# Patient Record
Sex: MALE | Race: WHITE | NOT HISPANIC OR LATINO | URBAN - METROPOLITAN AREA
[De-identification: names, ages, dates, MRNs, and addresses within clinical notes are randomized per-mention and may not be internally consistent; named-entity substitution may affect disease eponyms.]

---

## 2023-03-06 ENCOUNTER — APPOINTMENT (OUTPATIENT)
Dept: GENERAL RADIOLOGY | Facility: HOSPITAL | Age: 38
End: 2023-03-06
Payer: COMMERCIAL

## 2023-03-06 ENCOUNTER — HOSPITAL ENCOUNTER (EMERGENCY)
Facility: HOSPITAL | Age: 38
Discharge: HOME | End: 2023-03-06
Attending: EMERGENCY MEDICINE | Admitting: EMERGENCY MEDICINE
Payer: COMMERCIAL

## 2023-03-06 VITALS
RESPIRATION RATE: 20 BRPM | HEART RATE: 57 BPM | TEMPERATURE: 98 F | BODY MASS INDEX: 32.9 KG/M2 | DIASTOLIC BLOOD PRESSURE: 100 MMHG | WEIGHT: 248.24 LBS | SYSTOLIC BLOOD PRESSURE: 131 MMHG | OXYGEN SATURATION: 96 % | HEIGHT: 73 IN

## 2023-03-06 DIAGNOSIS — K21.9 GASTROESOPHAGEAL REFLUX DISEASE WITHOUT ESOPHAGITIS: Primary | ICD-10-CM

## 2023-03-06 LAB
ALBUMIN SERPL-MCNC: 4.5 G/DL (ref 3.5–5.2)
ALBUMIN/GLOB SERPL: 1.7 G/DL
ALP SERPL-CCNC: 79 U/L (ref 39–117)
ALT SERPL W P-5'-P-CCNC: 17 U/L (ref 1–41)
ANION GAP SERPL CALCULATED.3IONS-SCNC: 10.8 MMOL/L (ref 5–15)
AST SERPL-CCNC: 15 U/L (ref 1–40)
BASOPHILS # BLD AUTO: 0.03 10*3/MM3 (ref 0–0.2)
BASOPHILS NFR BLD AUTO: 0.4 % (ref 0–1.5)
BILIRUB SERPL-MCNC: 0.4 MG/DL (ref 0–1.2)
BUN SERPL-MCNC: 13 MG/DL (ref 6–20)
BUN/CREAT SERPL: 12.7 (ref 7–25)
CALCIUM SPEC-SCNC: 9.1 MG/DL (ref 8.6–10.5)
CHLORIDE SERPL-SCNC: 106 MMOL/L (ref 98–107)
CO2 SERPL-SCNC: 23.2 MMOL/L (ref 22–29)
CREAT SERPL-MCNC: 1.02 MG/DL (ref 0.76–1.27)
DEPRECATED RDW RBC AUTO: 38.8 FL (ref 37–54)
EGFRCR SERPLBLD CKD-EPI 2021: 96.5 ML/MIN/1.73
EOSINOPHIL # BLD AUTO: 0.18 10*3/MM3 (ref 0–0.4)
EOSINOPHIL NFR BLD AUTO: 2.7 % (ref 0.3–6.2)
ERYTHROCYTE [DISTWIDTH] IN BLOOD BY AUTOMATED COUNT: 12.1 % (ref 12.3–15.4)
GLOBULIN UR ELPH-MCNC: 2.7 GM/DL
GLUCOSE SERPL-MCNC: 77 MG/DL (ref 65–99)
HCT VFR BLD AUTO: 44.8 % (ref 37.5–51)
HGB BLD-MCNC: 16 G/DL (ref 13–17.7)
HOLD SPECIMEN: NORMAL
HOLD SPECIMEN: NORMAL
IMM GRANULOCYTES # BLD AUTO: 0.01 10*3/MM3 (ref 0–0.05)
IMM GRANULOCYTES NFR BLD AUTO: 0.1 % (ref 0–0.5)
LYMPHOCYTES # BLD AUTO: 2.33 10*3/MM3 (ref 0.7–3.1)
LYMPHOCYTES NFR BLD AUTO: 34.9 % (ref 19.6–45.3)
MCH RBC QN AUTO: 31.6 PG (ref 26.6–33)
MCHC RBC AUTO-ENTMCNC: 35.7 G/DL (ref 31.5–35.7)
MCV RBC AUTO: 88.5 FL (ref 79–97)
MONOCYTES # BLD AUTO: 0.46 10*3/MM3 (ref 0.1–0.9)
MONOCYTES NFR BLD AUTO: 6.9 % (ref 5–12)
NEUTROPHILS NFR BLD AUTO: 3.66 10*3/MM3 (ref 1.7–7)
NEUTROPHILS NFR BLD AUTO: 55 % (ref 42.7–76)
NRBC BLD AUTO-RTO: 0 /100 WBC (ref 0–0.2)
NT-PROBNP SERPL-MCNC: <36 PG/ML (ref 0–450)
PLATELET # BLD AUTO: 205 10*3/MM3 (ref 140–450)
PMV BLD AUTO: 9.3 FL (ref 6–12)
POTASSIUM SERPL-SCNC: 3.7 MMOL/L (ref 3.5–5.2)
PROT SERPL-MCNC: 7.2 G/DL (ref 6–8.5)
RBC # BLD AUTO: 5.06 10*6/MM3 (ref 4.14–5.8)
SODIUM SERPL-SCNC: 140 MMOL/L (ref 136–145)
TROPONIN T SERPL HS-MCNC: <6 NG/L
WBC NRBC COR # BLD: 6.67 10*3/MM3 (ref 3.4–10.8)
WHOLE BLOOD HOLD COAG: NORMAL
WHOLE BLOOD HOLD SPECIMEN: NORMAL

## 2023-03-06 PROCEDURE — 93005 ELECTROCARDIOGRAM TRACING: CPT | Performed by: EMERGENCY MEDICINE

## 2023-03-06 PROCEDURE — 85025 COMPLETE CBC W/AUTO DIFF WBC: CPT | Performed by: EMERGENCY MEDICINE

## 2023-03-06 PROCEDURE — 99284 EMERGENCY DEPT VISIT MOD MDM: CPT

## 2023-03-06 PROCEDURE — 93005 ELECTROCARDIOGRAM TRACING: CPT

## 2023-03-06 PROCEDURE — 93010 ELECTROCARDIOGRAM REPORT: CPT | Performed by: INTERNAL MEDICINE

## 2023-03-06 PROCEDURE — 83880 ASSAY OF NATRIURETIC PEPTIDE: CPT | Performed by: EMERGENCY MEDICINE

## 2023-03-06 PROCEDURE — 84484 ASSAY OF TROPONIN QUANT: CPT | Performed by: EMERGENCY MEDICINE

## 2023-03-06 PROCEDURE — 80053 COMPREHEN METABOLIC PANEL: CPT | Performed by: EMERGENCY MEDICINE

## 2023-03-06 PROCEDURE — 71045 X-RAY EXAM CHEST 1 VIEW: CPT

## 2023-03-06 RX ORDER — TRAZODONE HYDROCHLORIDE 50 MG/1
100 TABLET ORAL
COMMUNITY
Start: 2015-06-29

## 2023-03-06 RX ORDER — SODIUM CHLORIDE 0.9 % (FLUSH) 0.9 %
10 SYRINGE (ML) INJECTION AS NEEDED
Status: DISCONTINUED | OUTPATIENT
Start: 2023-03-06 | End: 2023-03-06 | Stop reason: HOSPADM

## 2023-03-06 RX ORDER — PRAZOSIN HYDROCHLORIDE 2 MG/1
2 CAPSULE ORAL NIGHTLY
COMMUNITY

## 2023-03-06 RX ORDER — TOPIRAMATE 100 MG/1
100 TABLET, FILM COATED ORAL DAILY
COMMUNITY

## 2023-03-06 RX ORDER — MIRTAZAPINE 30 MG/1
30 TABLET, FILM COATED ORAL NIGHTLY
COMMUNITY

## 2023-03-06 RX ORDER — GALCANEZUMAB 120 MG/ML
INJECTION, SOLUTION SUBCUTANEOUS
COMMUNITY
Start: 2023-01-31

## 2023-03-06 RX ORDER — OMEPRAZOLE 40 MG/1
40 CAPSULE, DELAYED RELEASE ORAL DAILY
Qty: 10 CAPSULE | Refills: 0 | Status: SHIPPED | OUTPATIENT
Start: 2023-03-06

## 2023-03-06 NOTE — ED PROVIDER NOTES
"Time: 12:36 PM EST  Date of encounter:  3/6/2023  Independent Historian/Clinical History and Information was obtained by:   {Blank multiple:98274}  Chief Complaint: ***    History is limited by: {Limited History:76303}    History of Present Illness:  Patient is a 38 y.o. year old male who presents to the emergency department for evaluation of ***    HPI    Patient Care Team  Primary Care Provider: Provider, Stefanie Known    Past Medical History:     No Known Allergies  History reviewed. No pertinent past medical history.  History reviewed. No pertinent surgical history.  History reviewed. No pertinent family history.    Home Medications:  Prior to Admission medications    Medication Sig Start Date End Date Taking? Authorizing Provider   traZODone (DESYREL) 50 MG tablet Take 2 tablets by mouth. 6/29/15  Yes Yinka Vang MD   Emgality 120 MG/ML auto-injector pen INJECT 1 SYRINGE UNDER THE SKIN FOR 1 DOSE 1/31/23   Yinka Vang MD   mirtazapine (REMERON) 30 MG tablet Take 1 tablet by mouth Every Night.    Yinka Vang MD   prazosin (MINIPRESS) 2 MG capsule Take 1 capsule by mouth Every Night.    Yinka Vang MD   topiramate (TOPAMAX) 100 MG tablet Take 1 tablet by mouth Daily.    ProviderYinka MD        Social History:   Social History     Tobacco Use   • Smoking status: Never   • Smokeless tobacco: Never   Vaping Use   • Vaping Use: Never used   Substance Use Topics   • Alcohol use: Never   • Drug use: Never         Review of Systems:  Review of Systems     Physical Exam:  /100   Pulse 57   Temp 98 °F (36.7 °C) (Oral)   Resp 20   Ht 185.4 cm (73\")   Wt 113 kg (248 lb 3.8 oz)   SpO2 96%   BMI 32.75 kg/m²     Physical Exam             Procedures:  Procedures      Medical Decision Making:      Comorbidities that affect care:    {Comorbidities that affect care:35435}    External Notes reviewed:    Encounter review: Patient has no past ED visits in our system.  Most " recent office visit was 7/6/2022 for neurology related to migraines.  No past labs available for review.      The following orders were placed and all results were independently analyzed by me:  Orders Placed This Encounter   Procedures   • XR Chest 1 View   • [place] Draw   • Comprehensive Metabolic Panel   • BNP   • Single High Sensitivity Troponin T   • CBC Auto Differential   • NPO Diet NPO Type: Strict NPO   • Undress & Gown   • Cardiac Monitoring   • Continuous Pulse Oximetry   • Vital Signs   • Oxygen Therapy- Nasal Cannula; 2 LPM; Titrate for SPO2: equal to or greater than, 92%   • ECG 12 Lead ED Triage Standing Order; SOA   • Insert Peripheral IV   • CBC & Differential   • Green Top (Gel)   • Lavender Top   • Gold Top - SST   • Light Blue Top       Medications Given in the Emergency Department:  Medications   sodium chloride 0.9 % flush 10 mL (has no administration in time range)        ED Course:    ED Course as of 03/06/23 1328   Mon Mar 06, 2023   1237 EKG interpretation: Normal sinus rhythm, heart rate 60, normal NV and QT intervals, normal QRS duration, left axis deviation, nonspecific ST segment changes with no acute ischemia. [RP]      ED Course User Index  [RP] Mohamud Atkins MD       Labs:    Lab Results (last 24 hours)     Procedure Component Value Units Date/Time    CBC & Differential [[id number]]  (Abnormal) Collected: 03/06/23 1150    Specimen: Blood Updated: 03/06/23 1158    Narrative:      The following orders were created for panel order CBC & Differential.  Procedure                               Abnormality         Status                     ---------                               -----------         ------                     CBC Auto Differential[[id number]]        Abnormal            Final result                 Please view results for these tests on the individual orders.    Comprehensive Metabolic Panel [[id number]] Collected: 03/06/23 1150    Specimen: Blood Updated: 03/06/23 1224      Glucose 77 mg/dL      BUN 13 mg/dL      Creatinine 1.02 mg/dL      Sodium 140 mmol/L      Potassium 3.7 mmol/L      Chloride 106 mmol/L      CO2 23.2 mmol/L      Calcium 9.1 mg/dL      Total Protein 7.2 g/dL      Albumin 4.5 g/dL      ALT (SGPT) 17 U/L      AST (SGOT) 15 U/L      Alkaline Phosphatase 79 U/L      Total Bilirubin 0.4 mg/dL      Globulin 2.7 gm/dL      A/G Ratio 1.7 g/dL      BUN/Creatinine Ratio 12.7     Anion Gap 10.8 mmol/L      eGFR 96.5 mL/min/1.73     Narrative:      GFR Normal >60  Chronic Kidney Disease <60  Kidney Failure <15      BNP [[id number]]  (Normal) Collected: 03/06/23 1150    Specimen: Blood Updated: 03/06/23 1221     proBNP <36.0 pg/mL     Narrative:      Among patients with dyspnea, NT-proBNP is highly sensitive for the detection of acute congestive heart failure. In addition NT-proBNP of <300 pg/ml effectively rules out acute congestive heart failure with 99% negative predictive value.      Single High Sensitivity Troponin T [[id number]]  (Normal) Collected: 03/06/23 1150    Specimen: Blood Updated: 03/06/23 1224     HS Troponin T <6 ng/L     Narrative:      High Sensitive Troponin T Reference Range:  <10.0 ng/L- Negative Female for AMI  <15.0 ng/L- Negative Male for AMI  >=10 - Abnormal Female indicating possible myocardial injury.  >=15 - Abnormal Male indicating possible myocardial injury.   Clinicians would have to utilize clinical acumen, EKG, Troponin, and serial changes to determine if it is an Acute Myocardial Infarction or myocardial injury due to an underlying chronic condition.         CBC Auto Differential [[id number]]  (Abnormal) Collected: 03/06/23 1150    Specimen: Blood Updated: 03/06/23 1158     WBC 6.67 10*3/mm3      RBC 5.06 10*6/mm3      Hemoglobin 16.0 g/dL      Hematocrit 44.8 %      MCV 88.5 fL      MCH 31.6 pg      MCHC 35.7 g/dL      RDW 12.1 %      RDW-SD 38.8 fl      MPV 9.3 fL      Platelets 205 10*3/mm3      Neutrophil % 55.0 %      Lymphocyte % 34.9 %       Monocyte % 6.9 %      Eosinophil % 2.7 %      Basophil % 0.4 %      Immature Grans % 0.1 %      Neutrophils, Absolute 3.66 10*3/mm3      Lymphocytes, Absolute 2.33 10*3/mm3      Monocytes, Absolute 0.46 10*3/mm3      Eosinophils, Absolute 0.18 10*3/mm3      Basophils, Absolute 0.03 10*3/mm3      Immature Grans, Absolute 0.01 10*3/mm3      nRBC 0.0 /100 WBC            Imaging:    XR Chest 1 View    Result Date: 3/6/2023  PROCEDURE: XR CHEST 1 VW  COMPARISON: None  INDICATIONS: SOA Triage Protocol  FINDINGS:  No focal or diffuse infiltrate is identified. No pleural effusion or pneumothorax. Heart size and mediastinal contour appear within normal limits.         No radiographic findings of acute cardiopulmonary abnormality.       RUSH MCMAHON MD       Electronically Signed and Approved By: RUSH MCMAHON MD on 3/06/2023 at 12:03                 Differential Diagnosis and Discussion:    {Differentials:32749}    {Independent Review of (Optional):50004}    MDM     {Critical Care:71185}    Patient Care Considerations:    {Considerations (Optional):17162}      Consultants/Shared Management Plan:    {Shared Management Plan (Optional):13183}    Social Determinants of Health:    {Social Determinants of Health (Optional):27397}      Disposition and Care Coordination:    {Admission consideration:67232}    {Discharge (Optional):12362}    Final diagnoses:   Gastroesophageal reflux disease without esophagitis        ED Disposition     ED Disposition   Discharge    Condition   Stable    Comment   --             This medical record created using voice recognition software.

## 2023-03-06 NOTE — DISCHARGE INSTRUCTIONS
Thankfully today in the emergency department your heart tests were normal.  This does not seem to be heart related as your symptoms are not worse with exertion and you are tender to touch over your upper abdomen.  Return to the ER for fever, vomiting blood, worsening pain.  Your liver/gallbladder numbers are normal today, however this could also be gallstones.  Follow-up your family doctor for further testing which might include gallbladder ultrasound, cardiology referral.

## 2023-03-06 NOTE — ED PROVIDER NOTES
"Time: 1:01 PM EST  Date of encounter:  3/6/2023  Independent Historian/Clinical History and Information was obtained by:   Patient  Chief Complaint: chest pain, shortness of breath    History is limited by: N/A    History of Present Illness:  Patient is a 38 y.o. year old male who presents to the emergency department for evaluation of chest pain and shortness of breath. This is the first time that the patient has experienced these symptoms. Denies reflux. Denies indigestion. Denies alcohol use. Denies anxiety. Denies numbness. Denies tingling. The pain started last night. It is located in the epigastric region and described as a \"heaviness\" and \"soreness\". It is constant. There are no aggravating or alleviating factors. The patient also has associated shortness of breath. Denies unilateral leg pain or leg swelling. No history of DVT or PE. No recent surgeries in the last 6 weeks. No history of cancer. No recent long immobilization in the last 6 weeks. The patient is not being treated for hypertension. The patient has no cholesterol or diabetes. Denies tobacco use. No family history of CAD.     Patient Care Team  Primary Care Provider: Provider, Stefanie Known    Past Medical History:     No Known Allergies  History reviewed. No pertinent past medical history.  History reviewed. No pertinent surgical history.  History reviewed. No pertinent family history.    Home Medications:  Prior to Admission medications    Medication Sig Start Date End Date Taking? Authorizing Provider   traZODone (DESYREL) 50 MG tablet Take 2 tablets by mouth. 6/29/15  Yes Yinka Vang MD   Emgality 120 MG/ML auto-injector pen INJECT 1 SYRINGE UNDER THE SKIN FOR 1 DOSE 1/31/23   Yinka Vang MD   mirtazapine (REMERON) 30 MG tablet Take 1 tablet by mouth Every Night.    Yinka Vang MD   prazosin (MINIPRESS) 2 MG capsule Take 1 capsule by mouth Every Night.    Yinka Vang MD   topiramate (TOPAMAX) 100 MG tablet " "Take 1 tablet by mouth Daily.    Provider, Historical, MD        Social History:   Social History     Tobacco Use   • Smoking status: Never   • Smokeless tobacco: Never   Vaping Use   • Vaping Use: Never used   Substance Use Topics   • Alcohol use: Never   • Drug use: Never         Review of Systems:  Review of Systems   Constitutional: Negative for chills and fever.   HENT: Negative for congestion, rhinorrhea and sore throat.    Eyes: Negative for photophobia.   Respiratory: Positive for shortness of breath. Negative for apnea, cough and chest tightness.    Cardiovascular: Positive for chest pain. Negative for palpitations.   Gastrointestinal: Negative for abdominal pain, diarrhea, nausea and vomiting.   Endocrine: Negative.    Genitourinary: Negative for difficulty urinating and dysuria.   Musculoskeletal: Negative for back pain, joint swelling and myalgias.   Skin: Negative for color change and wound.   Allergic/Immunologic: Negative.    Neurological: Negative for seizures and headaches.   Psychiatric/Behavioral: Negative.    All other systems reviewed and are negative.       Physical Exam:  /100   Pulse 57   Temp 98 °F (36.7 °C) (Oral)   Resp 20   Ht 185.4 cm (73\")   Wt 113 kg (248 lb 3.8 oz)   SpO2 96%   BMI 32.75 kg/m²     Physical Exam  Vitals and nursing note reviewed.   Constitutional:       General: He is awake.      Appearance: Normal appearance.   HENT:      Head: Normocephalic and atraumatic.      Nose: Nose normal.      Mouth/Throat:      Mouth: Mucous membranes are moist.   Eyes:      Extraocular Movements: Extraocular movements intact.      Pupils: Pupils are equal, round, and reactive to light.   Cardiovascular:      Rate and Rhythm: Normal rate and regular rhythm.      Heart sounds: Normal heart sounds.   Pulmonary:      Effort: Pulmonary effort is normal. No respiratory distress.      Breath sounds: Normal breath sounds. No wheezing, rhonchi or rales.   Abdominal:      General: Bowel " sounds are normal.      Palpations: Abdomen is soft.      Tenderness: There is abdominal tenderness in the epigastric area. There is no guarding or rebound.      Comments: No rigidity. Epigastric tenderness to palpation   Musculoskeletal:         General: No tenderness. Normal range of motion.      Cervical back: Normal range of motion and neck supple.   Skin:     General: Skin is warm and dry.      Coloration: Skin is not jaundiced.   Neurological:      General: No focal deficit present.      Mental Status: He is alert and oriented to person, place, and time. Mental status is at baseline.      Sensory: Sensation is intact.      Motor: Motor function is intact.      Coordination: Coordination is intact.   Psychiatric:         Attention and Perception: Attention and perception normal.         Mood and Affect: Mood and affect normal.         Speech: Speech normal.         Behavior: Behavior normal.         Judgment: Judgment normal.                  Procedures:  Procedures      Medical Decision Making:      Comorbidities that affect care:    None    External Notes reviewed:    None      The following orders were placed and all results were independently analyzed by me:  Orders Placed This Encounter   Procedures   • XR Chest 1 View   • [place] Draw   • Comprehensive Metabolic Panel   • BNP   • Single High Sensitivity Troponin T   • CBC Auto Differential   • NPO Diet NPO Type: Strict NPO   • Undress & Gown   • Cardiac Monitoring   • Continuous Pulse Oximetry   • Vital Signs   • Oxygen Therapy- Nasal Cannula; 2 LPM; Titrate for SPO2: equal to or greater than, 92%   • ECG 12 Lead ED Triage Standing Order; SOA   • Insert Peripheral IV   • CBC & Differential   • Green Top (Gel)   • Lavender Top   • Gold Top - SST   • Light Blue Top       Medications Given in the Emergency Department:  Medications   sodium chloride 0.9 % flush 10 mL (has no administration in time range)        ED Course:    ED Course as of 03/06/23 1342    Mon Mar 06, 2023   1237 EKG interpretation: Normal sinus rhythm, heart rate 60, normal [place] and QT intervals, normal QRS duration, left axis deviation, nonspecific ST segment changes with no acute ischemia. [RP]      ED Course User Index  [RP] Mohamud Atkins MD       Labs:    Lab Results (last 24 hours)     Procedure Component Value Units Date/Time    CBC & Differential [[id number]]  (Abnormal) Collected: 03/06/23 1150    Specimen: Blood Updated: 03/06/23 1158    Narrative:      The following orders were created for panel order CBC & Differential.  Procedure                               Abnormality         Status                     ---------                               -----------         ------                     CBC Auto Differential[[id number]]        Abnormal            Final result                 Please view results for these tests on the individual orders.    Comprehensive Metabolic Panel [[id number]] Collected: 03/06/23 1150    Specimen: Blood Updated: 03/06/23 1224     Glucose 77 mg/dL      BUN 13 mg/dL      Creatinine 1.02 mg/dL      Sodium 140 mmol/L      Potassium 3.7 mmol/L      Chloride 106 mmol/L      CO2 23.2 mmol/L      Calcium 9.1 mg/dL      Total Protein 7.2 g/dL      Albumin 4.5 g/dL      ALT (SGPT) 17 U/L      AST (SGOT) 15 U/L      Alkaline Phosphatase 79 U/L      Total Bilirubin 0.4 mg/dL      Globulin 2.7 gm/dL      A/G Ratio 1.7 g/dL      BUN/Creatinine Ratio 12.7     Anion Gap 10.8 mmol/L      eGFR 96.5 mL/min/1.73     Narrative:      GFR Normal >60  Chronic Kidney Disease <60  Kidney Failure <15      BNP [[id number]]  (Normal) Collected: 03/06/23 1150    Specimen: Blood Updated: 03/06/23 1221     proBNP <36.0 pg/mL     Narrative:      Among patients with dyspnea, NT-proBNP is highly sensitive for the detection of acute congestive heart failure. In addition NT-proBNP of <300 pg/ml effectively rules out acute congestive heart failure with 99% negative predictive value.      Single High  Sensitivity Troponin T [[id number]]  (Normal) Collected: 03/06/23 1150    Specimen: Blood Updated: 03/06/23 1224     HS Troponin T <6 ng/L     Narrative:      High Sensitive Troponin T Reference Range:  <10.0 ng/L- Negative Female for AMI  <15.0 ng/L- Negative Male for AMI  >=10 - Abnormal Female indicating possible myocardial injury.  >=15 - Abnormal Male indicating possible myocardial injury.   Clinicians would have to utilize clinical acumen, EKG, Troponin, and serial changes to determine if it is an Acute Myocardial Infarction or myocardial injury due to an underlying chronic condition.         CBC Auto Differential [[id number]]  (Abnormal) Collected: 03/06/23 1150    Specimen: Blood Updated: 03/06/23 1158     WBC 6.67 10*3/mm3      RBC 5.06 10*6/mm3      Hemoglobin 16.0 g/dL      Hematocrit 44.8 %      MCV 88.5 fL      MCH 31.6 pg      MCHC 35.7 g/dL      RDW 12.1 %      RDW-SD 38.8 fl      MPV 9.3 fL      Platelets 205 10*3/mm3      Neutrophil % 55.0 %      Lymphocyte % 34.9 %      Monocyte % 6.9 %      Eosinophil % 2.7 %      Basophil % 0.4 %      Immature Grans % 0.1 %      Neutrophils, Absolute 3.66 10*3/mm3      Lymphocytes, Absolute 2.33 10*3/mm3      Monocytes, Absolute 0.46 10*3/mm3      Eosinophils, Absolute 0.18 10*3/mm3      Basophils, Absolute 0.03 10*3/mm3      Immature Grans, Absolute 0.01 10*3/mm3      nRBC 0.0 /100 WBC            Imaging:    XR Chest 1 View    Result Date: 3/6/2023  PROCEDURE: XR CHEST 1 VW  COMPARISON: None  INDICATIONS: SOA Triage Protocol  FINDINGS:  No focal or diffuse infiltrate is identified. No pleural effusion or pneumothorax. Heart size and mediastinal contour appear within normal limits.         No radiographic findings of acute cardiopulmonary abnormality.       RUSH MCMAHON MD       Electronically Signed and Approved By: RUSH MCMAHON MD on 3/06/2023 at 12:03                 Differential Diagnosis and Discussion:    Chest Pain:  Based on the patient's signs and  symptoms, I considered aortic dissection, myocardial infaction, pulmonary embolism, cardiac tamponade, pericarditis, pneumothorax, musculoskeletal chest pain and other differential diagnosis as an etiology of the patient's chest pain.     All labs were reviewed and interpreted by me.  All X-rays were independently reviewed by me.  EKG was interpreted by me.    [facility]         Patient Care Considerations:    CONSULT: I considered consulting Cardiology, however EKG and troponin both nonischemic      Consultants/Shared Management Plan:    None    Social Determinants of Health:    Patient is independent, reliable, and has access to care.       Disposition and Care Coordination:    Discharged: The patient is suitable and stable for discharge with no need for consideration of observation or admission.    I have explained the patient´s condition, diagnoses and treatment plan based on the information available to me at this time. I have answered questions and addressed any concerns. The patient has a good  understanding of the patient´s diagnosis, condition, and treatment plan as can be expected at this point. The vital signs have been stable. The patient´s condition is stable and appropriate for discharge from the emergency department.      The patient will pursue further outpatient evaluation with the primary care physician or other designated or consulting physician as outlined in the discharge instructions. They are agreeable to this plan of care and follow-up instructions have been explained in detail. The patient has received these instructions in written format and have expressed an understanding of the discharge instructions. The patient is aware that any significant change in condition or worsening of symptoms should prompt an immediate return to this or the closest emergency department or call to 911.    Final diagnoses:   Gastroesophageal reflux disease without esophagitis        ED Disposition     ED Disposition    Discharge    Condition   Stable    Comment   Pt medically stable for discharge.  Discharge instructions reviewed and pt verbalized understanding             This medical record created using voice recognition software.             Isak Velasquez  03/06/23 1058       Mohamud Atkins MD  03/06/23 5165

## 2023-03-19 LAB — QT INTERVAL: 427 MS
